# Patient Record
Sex: FEMALE | Race: BLACK OR AFRICAN AMERICAN | NOT HISPANIC OR LATINO | URBAN - METROPOLITAN AREA
[De-identification: names, ages, dates, MRNs, and addresses within clinical notes are randomized per-mention and may not be internally consistent; named-entity substitution may affect disease eponyms.]

---

## 2020-03-13 ENCOUNTER — OFFICE VISIT CONVERTED (OUTPATIENT)
Dept: ORTHOPEDIC SURGERY | Facility: CLINIC | Age: 80
End: 2020-03-13
Attending: PHYSICIAN ASSISTANT

## 2020-06-29 ENCOUNTER — TELEPHONE CONVERTED (OUTPATIENT)
Dept: ORTHOPEDIC SURGERY | Facility: CLINIC | Age: 80
End: 2020-06-29
Attending: PHYSICIAN ASSISTANT

## 2021-05-13 NOTE — PROGRESS NOTES
Progress Note      Patient Name: Deysi Lin   Patient ID: [id number]   Sex: Female   YOB: 1940        Visit Date: June 29, 2020    Provider: Irma Alvarez PA-C   Location: [facility]   Location Address: [address] [place] [address]  [id number]   Location Phone: [phone]          Chief Complaint  · Right hip pain      History Of Present Illness  TELEHEALTH TELEPHONE VISIT  Chief Complaint: Right hip pain   Deysi Lin is a 80 year old /Black female who is presenting for evaluation via telehealth telephone visit. Verbal consent obtained before beginning visit.   Provider spent 10 minutes with the patient during the telehealth visit.   The following staff were present during this visit: Irma Alvarez PA-C   Past Medical History/ Overview of Patient Symptoms     She is s/p ORIF right intertrochanteric femur fracture 2/27/20 by Dr. Alvarez. She has history of vascular dementia and is currently inpatient at [facility]. Phone visit conducted on behalf of patient with bedside nurse, Sulma Su RN. Per Sulma, patient in 60/40 in bed and gets up with PT on walker. Most recent x-ray report showed well aligned IM nail with healing intertrochanteric femur fracture. Patient appears to have good pain control. Her plan is long term care facility.     Assessment: S/P ORIF right intertrochanteric femur fracture  Plan: Continue PT/OT. Follow up PRN.       Review of Systems  · Constitutional  o Denies  o : fever, chills, weight loss  · Cardiovascular  o Denies  o : chest pain, shortness of breath  · Gastrointestinal  o Denies  o : liver disease, heartburn, nausea, blood in stools  · Genitourinary  o Denies  o : painful urination, blood in urine  · Integument  o Denies  o : rash, itching  · Neurologic  o Denies  o : headache, weakness, loss of consciousness  · Musculoskeletal  o Denies  o : painful, swollen joints  · Psychiatric  o Denies  o : drug/alcohol addiction, anxiety,  "depression      Vitals  Date Time BP Position Site L\R Cuff Size HR RR TEMP (F) WT  HT  BMI kg/m2 BSA m2 O2 Sat HC       06/29/2020 02:58 PM         91lbs 0oz 5'  1\" 17.19 1.33           Physical Examination  · Constitutional  o Appearance  o : well developed, well-nourished, no obvious deformities present  · Head and Face  o Head  o :   § Inspection  § : normocephalic  o Face  o :   § Inspection  § : no facial lesions  · Eyes  o Conjunctivae  o : conjunctivae normal  o Sclerae  o : sclerae white  · Ears, Nose, Mouth and Throat  o Ears  o :   § External Ears  § : appearance within normal limits  § Hearing  § : intact  o Nose  o :   § External Nose  § : appearance normal  · Neck  o Inspection/Palpation  o : normal appearance  o Range of Motion  o : full range of motion  · Respiratory  o Respiratory Effort  o : breathing unlabored  o Inspection of Chest  o : normal appearance  o Auscultation of Lungs  o : no audible wheezing or rales  · Cardiovascular  o Heart  o : regular rate  · Gastrointestinal  o Abdominal Examination  o : soft and non-tender  · Skin and Subcutaneous Tissue  o General Inspection  o : intact, no rashes  · Psychiatric  o General  o : Alert and oriented x3  o Judgement and Insight  o : judgment and insight intact  o Mood and Affect  o : mood normal, affect appropriate          Plan  · Medications  o Medications have been Reconciled  o Transition of Care or Provider Policy  · Instructions  o Plan Of Care:   o Reviewed the patient's Past Medical, Social, and Family history as well as the ROS at today's visit, no changes.  o Call or return if worsening symptoms.            Electronically Signed by: TAIWO Nair-C -Author on June 30, 2020 08:06:51 AM  "

## 2021-05-15 VITALS — WEIGHT: 91 LBS | HEIGHT: 61 IN | BODY MASS INDEX: 17.18 KG/M2

## 2021-05-15 VITALS — HEIGHT: 61 IN | WEIGHT: 96 LBS | BODY MASS INDEX: 18.12 KG/M2
